# Patient Record
Sex: MALE | Race: WHITE | NOT HISPANIC OR LATINO | URBAN - METROPOLITAN AREA
[De-identification: names, ages, dates, MRNs, and addresses within clinical notes are randomized per-mention and may not be internally consistent; named-entity substitution may affect disease eponyms.]

---

## 2019-10-15 ENCOUNTER — APPOINTMENT (RX ONLY)
Dept: URBAN - METROPOLITAN AREA CLINIC 164 | Facility: CLINIC | Age: 44
Setting detail: DERMATOLOGY
End: 2019-10-15

## 2019-10-15 DIAGNOSIS — Z79.899 OTHER LONG TERM (CURRENT) DRUG THERAPY: ICD-10-CM

## 2019-10-15 DIAGNOSIS — L40.0 PSORIASIS VULGARIS: ICD-10-CM

## 2019-10-15 PROBLEM — D89.9 DISORDER INVOLVING THE IMMUNE MECHANISM, UNSPECIFIED: Status: ACTIVE | Noted: 2019-10-15

## 2019-10-15 PROBLEM — I10 ESSENTIAL (PRIMARY) HYPERTENSION: Status: ACTIVE | Noted: 2019-10-15

## 2019-10-15 PROBLEM — M12.9 ARTHROPATHY, UNSPECIFIED: Status: ACTIVE | Noted: 2019-10-15

## 2019-10-15 PROBLEM — M06.9 RHEUMATOID ARTHRITIS, UNSPECIFIED: Status: ACTIVE | Noted: 2019-10-15

## 2019-10-15 PROCEDURE — 99203 OFFICE O/P NEW LOW 30 MIN: CPT

## 2019-10-15 PROCEDURE — ? ADDITIONAL NOTES

## 2019-10-15 PROCEDURE — ? COUNSELING

## 2019-10-15 PROCEDURE — ? PRESCRIPTION

## 2019-10-15 RX ORDER — BETAMETHASONE DIPROPIONATE 0.5 MG/G
OINTMENT TOPICAL
Qty: 4 | Refills: 0 | Status: ERX | COMMUNITY
Start: 2019-10-15

## 2019-10-15 RX ADMIN — BETAMETHASONE DIPROPIONATE: 0.5 OINTMENT TOPICAL at 00:00

## 2019-10-15 ASSESSMENT — LOCATION SIMPLE DESCRIPTION DERM
LOCATION SIMPLE: RIGHT THIGH
LOCATION SIMPLE: LEFT HAND
LOCATION SIMPLE: CHEST
LOCATION SIMPLE: RIGHT PRETIBIAL REGION
LOCATION SIMPLE: LEFT KNEE
LOCATION SIMPLE: ABDOMEN
LOCATION SIMPLE: RIGHT HAND
LOCATION SIMPLE: LEFT PRETIBIAL REGION

## 2019-10-15 ASSESSMENT — BSA PSORIASIS: % BODY COVERED IN PSORIASIS: 0

## 2019-10-15 ASSESSMENT — LOCATION ZONE DERM
LOCATION ZONE: TRUNK
LOCATION ZONE: LEG
LOCATION ZONE: HAND
LOCATION ZONE: TRUNK

## 2019-10-15 ASSESSMENT — LOCATION DETAILED DESCRIPTION DERM
LOCATION DETAILED: LEFT ULNAR DORSAL HAND
LOCATION DETAILED: RIGHT DORSAL MIDDLE METACARPOPHALANGEAL JOINT
LOCATION DETAILED: RIGHT PROXIMAL PRETIBIAL REGION
LOCATION DETAILED: RIGHT ANTERIOR DISTAL THIGH
LOCATION DETAILED: PERIUMBILICAL SKIN
LOCATION DETAILED: LEFT KNEE
LOCATION DETAILED: LEFT PROXIMAL PRETIBIAL REGION
LOCATION DETAILED: LEFT MEDIAL INFERIOR CHEST

## 2019-10-15 NOTE — HPI: RASH (PSORIASIS)
How Severe Is Your Psoriasis?: moderate
Is This A New Presentation, Or A Follow-Up?: Psoriasis
Additional History: Pt has been on humira for a while and it worked the best.

## 2019-10-21 ENCOUNTER — RX ONLY (OUTPATIENT)
Age: 44
Setting detail: RX ONLY
End: 2019-10-21

## 2019-10-21 RX ORDER — BETAMETHASONE DIPROPIONATE 0.5 MG/G
OINTMENT TOPICAL
Qty: 1 | Refills: 0 | Status: ERX | COMMUNITY
Start: 2019-10-21

## 2019-12-02 ENCOUNTER — RX ONLY (OUTPATIENT)
Age: 44
Setting detail: RX ONLY
End: 2019-12-02

## 2019-12-02 RX ORDER — BETAMETHASONE DIPROPIONATE 0.5 MG/G
OINTMENT TOPICAL
Qty: 4 | Refills: 0 | Status: ERX

## 2020-04-17 ENCOUNTER — APPOINTMENT (RX ONLY)
Dept: URBAN - METROPOLITAN AREA CLINIC 164 | Facility: CLINIC | Age: 45
Setting detail: DERMATOLOGY
End: 2020-04-17

## 2020-04-17 DIAGNOSIS — Z02.9 ENCOUNTER FOR ADMINISTRATIVE EXAMINATIONS, UNSPECIFIED: ICD-10-CM

## 2020-04-17 PROCEDURE — ? ADDITIONAL NOTES

## 2020-04-17 NOTE — PROCEDURE: ADDITIONAL NOTES
Detail Level: Zone
Additional Notes: Telehealth appointment. Invited for video chat through [email or web address] and [employer] and no answer. Called and no answer, unable to leave message

## 2020-04-24 ENCOUNTER — APPOINTMENT (RX ONLY)
Dept: URBAN - METROPOLITAN AREA CLINIC 164 | Facility: CLINIC | Age: 45
Setting detail: DERMATOLOGY
End: 2020-04-24

## 2020-04-24 DIAGNOSIS — L40.0 PSORIASIS VULGARIS: ICD-10-CM | Status: WORSENING

## 2020-04-24 PROCEDURE — ? COUNSELING

## 2020-04-24 PROCEDURE — 99213 OFFICE O/P EST LOW 20 MIN: CPT | Mod: 95

## 2020-04-24 PROCEDURE — ? PRESCRIPTION

## 2020-04-24 PROCEDURE — ? ADDITIONAL NOTES

## 2020-04-24 RX ORDER — CLOBETASOL PROPIONATE 0.5 MG/G
OINTMENT TOPICAL
Qty: 2 | Refills: 3 | Status: ERX | COMMUNITY
Start: 2020-04-24

## 2020-04-24 RX ADMIN — CLOBETASOL PROPIONATE: 0.5 OINTMENT TOPICAL at 00:00

## 2020-04-24 ASSESSMENT — LOCATION SIMPLE DESCRIPTION DERM
LOCATION SIMPLE: RIGHT PRETIBIAL REGION
LOCATION SIMPLE: LEFT UPPER ARM
LOCATION SIMPLE: ABDOMEN
LOCATION SIMPLE: RIGHT UPPER ARM
LOCATION SIMPLE: LOWER BACK
LOCATION SIMPLE: LEFT PRETIBIAL REGION

## 2020-04-24 ASSESSMENT — LOCATION ZONE DERM
LOCATION ZONE: LEG
LOCATION ZONE: ARM
LOCATION ZONE: TRUNK

## 2020-04-24 ASSESSMENT — PGA PSORIASIS: PGA PSORIASIS 2020: MODERATE

## 2020-04-24 ASSESSMENT — LOCATION DETAILED DESCRIPTION DERM
LOCATION DETAILED: LEFT DISTAL POSTERIOR UPPER ARM
LOCATION DETAILED: RIGHT DISTAL POSTERIOR UPPER ARM
LOCATION DETAILED: LEFT PROXIMAL PRETIBIAL REGION
LOCATION DETAILED: RIGHT PROXIMAL PRETIBIAL REGION
LOCATION DETAILED: PERIUMBILICAL SKIN
LOCATION DETAILED: SUPERIOR LUMBAR SPINE

## 2020-04-24 NOTE — PROCEDURE: ADDITIONAL NOTES
Detail Level: Zone
Additional Notes: Patient has used different biologics for about 10 years due to RA. Most recently Humira which significantly helps with skin psoriasis. Humira was discontinued about 5 months ago during a hospitalization, then patient decided not to re start due to COVID 19. His psoriasis has been progressively worsening. He has an appointment with his rheumatologist next week and will decide when to go back to treatment.